# Patient Record
Sex: MALE | Race: WHITE
[De-identification: names, ages, dates, MRNs, and addresses within clinical notes are randomized per-mention and may not be internally consistent; named-entity substitution may affect disease eponyms.]

---
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
## 2023-01-14 ENCOUNTER — HOSPITAL ENCOUNTER (EMERGENCY)
Dept: HOSPITAL 47 - EC | Age: 23
LOS: 1 days | Discharge: HOME | End: 2023-01-15
Payer: COMMERCIAL

## 2023-01-14 VITALS — TEMPERATURE: 99.3 F

## 2023-01-14 VITALS — RESPIRATION RATE: 14 BRPM | DIASTOLIC BLOOD PRESSURE: 70 MMHG | SYSTOLIC BLOOD PRESSURE: 108 MMHG

## 2023-01-14 VITALS — HEART RATE: 85 BPM

## 2023-01-14 DIAGNOSIS — F10.129: Primary | ICD-10-CM

## 2023-01-14 DIAGNOSIS — Z79.899: ICD-10-CM

## 2023-01-14 DIAGNOSIS — J45.909: ICD-10-CM

## 2023-01-14 LAB
ALBUMIN SERPL-MCNC: 5.1 G/DL (ref 3.5–5)
ALP SERPL-CCNC: 82 U/L (ref 38–126)
ALT SERPL-CCNC: 79 U/L (ref 4–49)
ANION GAP SERPL CALC-SCNC: 17 MMOL/L
AST SERPL-CCNC: 45 U/L (ref 17–59)
BASOPHILS # BLD AUTO: 0.2 K/UL (ref 0–0.2)
BASOPHILS NFR BLD AUTO: 1 %
BUN SERPL-SCNC: 12 MG/DL (ref 9–20)
CALCIUM SPEC-MCNC: 9.4 MG/DL (ref 8.4–10.2)
CHLORIDE SERPL-SCNC: 107 MMOL/L (ref 98–107)
CO2 SERPL-SCNC: 21 MMOL/L (ref 22–30)
EOSINOPHIL # BLD AUTO: 2.1 K/UL (ref 0–0.7)
EOSINOPHIL NFR BLD AUTO: 14 %
ERYTHROCYTE [DISTWIDTH] IN BLOOD BY AUTOMATED COUNT: 5.04 M/UL (ref 4.3–5.9)
ERYTHROCYTE [DISTWIDTH] IN BLOOD: 14.1 % (ref 11.5–15.5)
GLUCOSE SERPL-MCNC: 147 MG/DL (ref 74–99)
HCT VFR BLD AUTO: 42.8 % (ref 39–53)
HGB BLD-MCNC: 15.3 GM/DL (ref 13–17.5)
LIPASE SERPL-CCNC: 51 U/L (ref 23–300)
LYMPHOCYTES # SPEC AUTO: 3.1 K/UL (ref 1–4.8)
LYMPHOCYTES NFR SPEC AUTO: 21 %
MAGNESIUM SPEC-SCNC: 2.1 MG/DL (ref 1.6–2.3)
MCH RBC QN AUTO: 30.3 PG (ref 25–35)
MCHC RBC AUTO-ENTMCNC: 35.7 G/DL (ref 31–37)
MCV RBC AUTO: 84.8 FL (ref 80–100)
MONOCYTES # BLD AUTO: 0.7 K/UL (ref 0–1)
MONOCYTES NFR BLD AUTO: 5 %
NEUTROPHILS # BLD AUTO: 8.4 K/UL (ref 1.3–7.7)
NEUTROPHILS NFR BLD AUTO: 57 %
PLATELET # BLD AUTO: 330 K/UL (ref 150–450)
POTASSIUM SERPL-SCNC: 3.5 MMOL/L (ref 3.5–5.1)
PROT SERPL-MCNC: 7.9 G/DL (ref 6.3–8.2)
SODIUM SERPL-SCNC: 145 MMOL/L (ref 137–145)
WBC # BLD AUTO: 14.7 K/UL (ref 4–11)

## 2023-01-14 PROCEDURE — 96360 HYDRATION IV INFUSION INIT: CPT

## 2023-01-14 PROCEDURE — 99284 EMERGENCY DEPT VISIT MOD MDM: CPT

## 2023-01-14 PROCEDURE — 83690 ASSAY OF LIPASE: CPT

## 2023-01-14 PROCEDURE — 36415 COLL VENOUS BLD VENIPUNCTURE: CPT

## 2023-01-14 PROCEDURE — 80053 COMPREHEN METABOLIC PANEL: CPT

## 2023-01-14 PROCEDURE — 80320 DRUG SCREEN QUANTALCOHOLS: CPT

## 2023-01-14 PROCEDURE — 83735 ASSAY OF MAGNESIUM: CPT

## 2023-01-14 PROCEDURE — 85025 COMPLETE CBC W/AUTO DIFF WBC: CPT

## 2023-01-14 NOTE — ED
General Adult HPI





- General


Chief complaint: Alcohol


Stated complaint: ETOH


Time Seen by Provider: 01/14/23 20:51


Source: family


Mode of arrival: wheelchair





- History of Present Illness


Initial comments: 


This is a 22-year-old male was brought in to the emergency department by family 

for alcohol intoxication.  Reported that the patient had a large amount of 

alcohol to drink at [facility] and was unresponsive to family.  The patient 

reportedly had a large amount of vomit on arrival to the emergency department 

and was difficult to get out of the car.  The patient did not appear to have 

suffered any trauma but was severely intoxicated.  The patient was only 

arousable to painful stimuli.  No further history could be obtained at this time

by the patient nor the by the family.








- Related Data


                                Home Medications











 Medication  Instructions  Recorded  Confirmed


 


Albuterol Nebulized [Ventolin 2.5 mg INHALATION RT-Q4H PRN 11/27/17 01/14/23





Nebulized]   


 


Albuterol Sulfate [Proair Hfa] 2 puff INHALATION RT-Q4H PRN 11/27/17 01/14/23


 


Fexofenadine HCl [Allegra Allergy] 180 mg PO DAILY 11/27/17 01/14/23


 


Montelukast [Singulair] 10 mg PO HS 11/27/17 01/14/23


 


Azelastine HCl [Astelin Nasal 2 sprays EA NOSTRIL BID PRN 01/14/23 01/14/23





Spray]   


 


EPINEPHrine (Auto Inject) [Epipen] 0.3 mg IM ONCE PRN 01/14/23 01/14/23


 


Fluticasone Nasal Spray [Flonase 2 spray EA NOSTRIL DAILY 01/14/23 01/14/23





Nasal Spray]   


 


Fluticasone/Umeclidin/Vilanter 1 puff INHALATION RT-DAILY 01/14/23 01/14/23





[Trelegy Ellipta 200-62.5-25]   


 


Tiotropium 2.5 Mcg/Puff [Spiriva 2 puff INHALATION RT-DAILY 01/14/23 01/14/23





Respimat 2.5 Mcg]   


 


amLODIPine [Norvasc] 10 mg PO AS DIRECTED 01/14/23 01/14/23











                                    Allergies











Allergy/AdvReac Type Severity Reaction Status Date / Time


 


No Known Allergies Allergy   Verified 01/14/23 21:48














Review of Systems


ROS Statement: 


Those systems with pertinent positive or pertinent negative responses have been 

documented in the HPI.





ROS Other: All systems not noted in ROS Statement are negative.





Past Medical History


Past Medical History: Asthma


Additional Past Medical History / Comment(s): eczema


History of Any Multi-Drug Resistant Organisms: None Reported


Past Surgical History: Adenoidectomy, Appendectomy, Tonsillectomy


Past Psychological History: No Psychological Hx Reported


Past Alcohol Use History: None Reported


Past Drug Use History: None Reported





General Exam


Limitations: altered mental status (Patient significantly intoxicated)


General appearance: in no apparent distress, appears intoxicated, obese


Head exam: Present: atraumatic, normocephalic, normal inspection


Eye exam: Present: normal appearance, PERRL


Pupils: Present: normal accommodation


ENT exam: Present: normal exam, normal oropharynx, mucous membranes moist


Neck exam: Present: normal inspection, full ROM


Respiratory exam: Present: normal lung sounds bilaterally


Cardiovascular Exam: Present: regular rate, normal rhythm, normal heart sounds


GI/Abdominal exam: Present: soft, normal bowel sounds


Extremities exam: Present: normal inspection, full ROM


Back exam: Present: normal inspection, full ROM


Neurological exam: Present: alert, oriented X3, CN II-XII intact


Psychiatric exam: Present: normal affect, normal mood


Skin exam: Present: warm, dry





Course


                                   Vital Signs











  01/14/23 01/14/23 01/14/23





  20:48 21:28 22:25


 


Temperature 99.3 F  


 


Pulse Rate 98 90 100


 


Respiratory 18 18 14





Rate   


 


Blood Pressure 117/58 102/59 108/70


 


O2 Sat by Pulse 99 98 99





Oximetry   














  01/14/23 01/14/23





  22:32 22:49


 


Temperature  


 


Pulse Rate 87 85


 


Respiratory  





Rate  


 


Blood Pressure  


 


O2 Sat by Pulse  99





Oximetry  














Medical Decision Making





- Medical Decision Making


Was pt. sent in by a medical professional or institution (, PA, NP, urgent 

care, hospital, or nursing home...) When possible be specific


@  -No


Did you speak to anyone other than the patient for history (EMS, parent, family,

police, friend...)? What history was obtained from this source 


@  -Yes, patient's father and brother


Did you review nursing and triage notes (agree or disagree)?  Why? 


@  -I reviewed and agree with nursing and triage notes


Were old charts reviewed (outside hosp., previous admission, EMS record, old 

EKG, old radiological studies, urgent care reports/EKG's, nursing home records)?

Report findings 


@  -No old charts were reviewed


Differential Diagnosis (chest pain, altered mental status, abdominal pain women,

abdominal pain men, vaginal bleeding, weakness, fever, dyspnea, syncope, 

headache, dizziness, GI bleed, back pain, seizure, CVA, palpatations, mental 

health)? 


@  -Intercranial hemorrhage, acute alcohol intoxication


EKG interpreted by me (3pts min.).


@  -None


X-rays interpreted by me (1pt min.).


@  -None done


CT interpreted by me (1pt min.).


@  -None done


U/S interpreted by me (1pt. min.).


@  -None done


What testing was considered but not performed or refused? (CT, X-rays, U/S, 

labs)? Why?


@  -None


What meds were considered but not given or refused? Why?


@  -None


Did you discuss the management of the patient with other professionals 

(professionals i.e. , PA, NP, lab, RT, psych nurse, , , 

teacher, , )? Give summary


@  -No


Was smoking cessation discussed for >3mins.?


@  -No


Was critical care preformed (if so, how long)?


@  -No


Were there social determinants of health that impacted care today? How? 

(Homelessness, low income, unemployed, alcoholism, drug addiction, transp

ortation, low edu. Level, literacy, decrease access to med. care, long-term, rehab)?


@  -No


Was there de-escalation of care discussed even if they declined (Discuss DNR or 

withdrawal of care, Hospice)? DNR status


@  -No


What co-morbidities impacted this encounter? (DM, HTN, Smoking, COPD, CAD, 

Cancer, CVA, ARF, Chemo, Hep., AIDS, mental health diagnosis, sleep apnea, 

morbid obesity)?


@  -None


Was patient admitted / discharged? Hospital course, mention meds given and 

route, prescriptions, significant lab abnormalities, going to OR and other 

pertinent info.


@  -Was seen and evaluated emergency department.  Physical exam, the patient was

intoxicated however was able to arouse with painful stimuli.  Laboratory workup 

showed an EtOH level of 250.  The patient did continue to remain stable and the 

patient's father was at the bedside.  The patient continued be closely monitored

and observed and was discharged home with the patient's father when he was more 

in laboratory.  The patient was still intoxicated however was able to go home 

with a reliable source.  The patient's father was advised to report back to the 

emergency department if he noted any changes in his son including worsening 

shortness of breath or difficulty in breathing.  He did agree to this and the 

patient was discharged home in stable condition with his father.


Undiagnosed new problem with uncertain prognosis?


@  -No


Drug Therapy requiring intensive monitoring for toxicity (Heparin, Nitro, 

Insulin, Cardizem)?


@  -No


Were any procedures done?


@  -No


Diagnosis/symptom?


@  -Alcohol intoxication


Acute, or Chronic, or Acute on Chronic?


@  -Acute


Uncomplicated (without systemic symptoms) or Complicated (systemic symptoms)?


@  -Uncomplicated


Side effects of treatment?


@  -No


Exacerbation, Progression, or Severe Exacerbation?


@  -No


Poses a threat to life or bodily function? How? (Chest pain, USA, MI, pneumonia,

PE, COPD, DKA, ARF, appy, cholecystitis, CVA, Diverticulitis, Homicidal, 

Suicidal, threat to staff... and all critical care pts)


@  -No








- Lab Data


Result diagrams: 


                                 01/14/23 20:54





                                 01/14/23 20:54


                                   Lab Results











  01/14/23 01/14/23 Range/Units





  20:54 20:54 


 


WBC  14.7 H   (4.0-11.0)  k/uL


 


RBC  5.04   (4.30-5.90)  m/uL


 


Hgb  15.3   (13.0-17.5)  gm/dL


 


Hct  42.8   (39.0-53.0)  %


 


MCV  84.8   (80.0-100.0)  fL


 


MCH  30.3   (25.0-35.0)  pg


 


MCHC  35.7   (31.0-37.0)  g/dL


 


RDW  14.1   (11.5-15.5)  %


 


Plt Count  330   (150-450)  k/uL


 


MPV  7.2   


 


Neutrophils %  57   %


 


Lymphocytes %  21   %


 


Monocytes %  5   %


 


Eosinophils %  14   %


 


Basophils %  1   %


 


Neutrophils #  8.4 H   (1.3-7.7)  k/uL


 


Lymphocytes #  3.1   (1.0-4.8)  k/uL


 


Monocytes #  0.7   (0-1.0)  k/uL


 


Eosinophils #  2.1 H   (0-0.7)  k/uL


 


Basophils #  0.2   (0-0.2)  k/uL


 


Hyperchromasia  Moderate   


 


Poikilocytosis  Slight   


 


Sodium   145  (137-145)  mmol/L


 


Potassium   3.5  (3.5-5.1)  mmol/L


 


Chloride   107  ()  mmol/L


 


Carbon Dioxide   21 L  (22-30)  mmol/L


 


Anion Gap   17  mmol/L


 


BUN   12  (9-20)  mg/dL


 


Creatinine   0.74  (0.66-1.25)  mg/dL


 


Est GFR (CKD-EPI)AfAm   >90  (>60 ml/min/1.73 sqM)  


 


Est GFR (CKD-EPI)NonAf   >90  (>60 ml/min/1.73 sqM)  


 


Glucose   147 H  (74-99)  mg/dL


 


Calcium   9.4  (8.4-10.2)  mg/dL


 


Magnesium   2.1  (1.6-2.3)  mg/dL


 


Total Bilirubin   0.8  (0.2-1.3)  mg/dL


 


AST   45  (17-59)  U/L


 


ALT   79 H  (4-49)  U/L


 


Alkaline Phosphatase   82  ()  U/L


 


Total Protein   7.9  (6.3-8.2)  g/dL


 


Albumin   5.1 H  (3.5-5.0)  g/dL


 


Lipase   51  ()  U/L


 


Serum Alcohol   258 H*  mg/dL














Disposition


Clinical Impression: 


 Alcoholic intoxication





Disposition: HOME SELF-CARE


Condition: Stable


Instructions (If sedation given, give patient instructions):  Alcohol 

Intoxication (ED)


Is patient prescribed a controlled substance at d/c from ED?: No


Referrals: 


Jorge A Leonard MD [Primary Care Provider] - 1-2 days


Time of Disposition: 00:32